# Patient Record
Sex: FEMALE | Race: WHITE | NOT HISPANIC OR LATINO | Employment: OTHER | URBAN - METROPOLITAN AREA
[De-identification: names, ages, dates, MRNs, and addresses within clinical notes are randomized per-mention and may not be internally consistent; named-entity substitution may affect disease eponyms.]

---

## 2023-11-30 ENCOUNTER — TELEPHONE (OUTPATIENT)
Dept: HEMATOLOGY/ONCOLOGY | Facility: HOSPITAL | Age: 73
End: 2023-11-30

## 2023-12-04 PROBLEM — R59.0 AXILLARY LYMPHADENOPATHY: Status: ACTIVE | Noted: 2023-12-04

## 2023-12-04 PROBLEM — C50.912 CARCINOMA OF LEFT BREAST METASTATIC TO AXILLARY LYMPH NODE (MULTI): Status: ACTIVE | Noted: 2023-12-04

## 2023-12-04 PROBLEM — C77.3 CARCINOMA OF LEFT BREAST METASTATIC TO AXILLARY LYMPH NODE (MULTI): Status: ACTIVE | Noted: 2023-12-04

## 2023-12-04 PROBLEM — N63.20 LEFT BREAST MASS: Status: ACTIVE | Noted: 2023-12-04

## 2023-12-04 RX ORDER — VALSARTAN AND HYDROCHLOROTHIAZIDE 320; 25 MG/1; MG/1
1 TABLET, FILM COATED ORAL DAILY
COMMUNITY

## 2023-12-04 RX ORDER — AMLODIPINE BESYLATE 5 MG/1
5 TABLET ORAL DAILY
COMMUNITY
Start: 2022-03-08

## 2023-12-04 RX ORDER — AZITHROMYCIN 250 MG/1
500 TABLET, FILM COATED ORAL DAILY
COMMUNITY
Start: 2023-03-23 | End: 2024-03-21 | Stop reason: ALTCHOICE

## 2023-12-04 RX ORDER — ALENDRONATE SODIUM 70 MG/1
70 TABLET ORAL
COMMUNITY
Start: 2023-09-13

## 2023-12-04 RX ORDER — MULTIVITAMIN WITH IRON
1 TABLET ORAL DAILY
COMMUNITY
Start: 2022-03-08

## 2023-12-04 RX ORDER — CHLORHEXIDINE GLUCONATE ORAL RINSE 1.2 MG/ML
15 SOLUTION DENTAL 2 TIMES DAILY PRN
COMMUNITY
Start: 2023-04-25 | End: 2024-03-21 | Stop reason: ALTCHOICE

## 2023-12-08 ENCOUNTER — OFFICE VISIT (OUTPATIENT)
Dept: HEMATOLOGY/ONCOLOGY | Facility: CLINIC | Age: 73
End: 2023-12-08
Payer: MEDICARE

## 2023-12-08 VITALS
TEMPERATURE: 98.1 F | DIASTOLIC BLOOD PRESSURE: 79 MMHG | BODY MASS INDEX: 28.95 KG/M2 | WEIGHT: 169.97 LBS | SYSTOLIC BLOOD PRESSURE: 147 MMHG | HEART RATE: 79 BPM

## 2023-12-08 DIAGNOSIS — C77.3 CARCINOMA OF LEFT BREAST METASTATIC TO AXILLARY LYMPH NODE (MULTI): ICD-10-CM

## 2023-12-08 DIAGNOSIS — C50.912 CARCINOMA OF LEFT BREAST METASTATIC TO AXILLARY LYMPH NODE (MULTI): ICD-10-CM

## 2023-12-08 PROCEDURE — 99214 OFFICE O/P EST MOD 30 MIN: CPT | Performed by: INTERNAL MEDICINE

## 2023-12-08 PROCEDURE — 1159F MED LIST DOCD IN RCRD: CPT | Performed by: INTERNAL MEDICINE

## 2023-12-08 PROCEDURE — 1126F AMNT PAIN NOTED NONE PRSNT: CPT | Performed by: INTERNAL MEDICINE

## 2023-12-08 ASSESSMENT — PAIN SCALES - GENERAL: PAINLEVEL: 0-NO PAIN

## 2023-12-08 NOTE — PROGRESS NOTES
Patient Visit Information:   Visit Type: Follow Up Visit      Cancer History:          Breast         AJCC Edition: 8th (AJCC), Diagnosis Date: 07-Feb-2020, IIIB, cT4d cN1 cM0 G3     Treatment Synopsis:    73-year-old postmenopausal  female with left-sided her2 positive inflammatory breast cancer, stage IIIB (T4d N1 M0). The patient's breast cancer was diagnosed  on February 7, 2020, and is estrogen receptor negative at 0%, progesterone receptor negative at 0%, and HER-2/musa positive.     CURRENT THERAPY: Close surveillance      Details of her history are as follows:      02/07/2020: Left breast US. Showed large mass in the subareolar complex measuring 12:00, measuring 3.7 x 2.7 x 2.6cm. This mass extended to  the 1:00 position, 1 cm from the nipple. Also showed another mass at 11:00, 4cm from the nipple, measuring 1.0 x 0.4 x 0.4cm. Scanning of the left axilla showed several abnormal LNs with calcifications, the largest of which measure 4.0 x 4.0 x 2.6cm.       02/07/2020: Left breast skin punch biopsy. Pathology reveled invasive ductal carcinoma , grade 2, involving the dermis with LVI      02/12/2020: US-guided left breast biopsy at 11:00, 4cm from the nipple. Pathology revealed invasive ductal carcinoma, grade 2-3 with receptor  status as above      02/12/2020: US-guided left breast biopsy at 12:00, 1cm from the nipple. Pathology revealed invasive ductal carcinoma, grade 2-3 with receptor  status as above. DCIS was present      02/12/2020: US-guided left axillary LN biopsy. Pathology revealed metastatic carcinoma      02/25/2020: Bone scan revealed no evidence of distant metastatic disease      02/25/2020: CT of CAP. Showed left breast mass, numerous enlarged left axillary LNs, no evidence of distant metastatic disease      02/25/2020: Echocardiogram showed LVEF of 55-60%      02/28/2020: Mediport placement      03/05/2020: Received cycle #1 of TCHP      07/10/2020: Completed 6 cycles of TCHP       07/31/2020: Started maintenance HP      07/31/2020: Received first cycle of maintenance HP      09/09/2020: Completed a left breast mastectomy with axillary LN dissection; Pathology revealed carcinoma involving vascular spaces at multiple  sites at tumor bed sites with largest invasion being 4mm , but no residual disease and extensive DCIS and LVI, and 0/10 LN with 2 tumors with therapy effect and 1 with isolated tumor cells.    12/11/2020: Received the last dose of  maintenance HP. Altogether patient received 6 cycle of TCHP and 6  and 6 cycles of maintenance HP.      01/22/2021: Received the first cycle of Kadcyla (TDM-1).    07/09/2021: Last cycle of Kadcycla. Discontinued due to persistent thrombocytopenia. All together patient received  12 cycles of Pertuzumab/Herceptin and 9 cycles of Kadcyla.       9/22/2021: Underwent a bone marrow biospy due to persistent thrombocytopenia. No abnormality was detected     19. 12/03/2021: MRI of Lumbar spine, SACRUM-COCCYX. This showed intraosseous disc herniation at L4 and mild DJD with no evidence of malignancy.            History of Present Illness:      ID Statement:    RHONDA HSU is a 73 year old Female        Chief Complaint: 73 year old post-menopausal   female with  stage IIIB left-sided inflammatory breast cancer. Here for a follow-up visit.   Interval History:    Ms. Hsu is a 73-year-old postmenopausal  female with left-sided her2 positive inflammatory breast cancer, stage IIIB (T4d N1 M0). The patient's breast  cancer was diagnosed on February 7, 2020, and is estrogen receptor negative at 0%, progesterone receptor negative at 0%, and HER-2/musa positive.      She is s/p a left breast mastectomy with axillary LN dissection on 09/09/2020. Pathology revealed carcinoma involving vascular spaces at multiple sites at tumor bed sites with largest invasion being 4mm , but no residual disease and extensive DCIS and  LVI, and 0/10 LN with 2 tumors  with therapy effect and 1 with isolated tumor cells.      S/P post-mastectomy radiation with proton therapy.  Received the last dose of  maintenance HP on 12/11/2020. Altogether patient received 6 cycle of TCHP and 6 cycles of maintenance HP.      INTERVAL HISTORY:     She has no new complaints today.  Reports that she is  doing well with no complaints.     Her last echo was completed on 07/07/21 and showed a LVEF of 65-70%. PET/CT (2/12/21) and mammogram (3/15/23) were unremarkable.                     Review of Systems:   Review of Systems:    CONSTITUTIONAL:  Usual activity level, good appetite, no fever, chills, malaise, weight loss, fatigue.  HEAD/NECK: no  headache, vision changes, epistaxis+, congestion,  RESPIRATORY:  No cough, hemoptysis, wheezing, SOB, pain.  CARDIOVASCULAR:  No chest pain, orthopnea, palpitations, syncope, edema, calf pain.  BREAST:  No pain, mass+, discharge, skin changes+, swelling, nipple changes+.  GASTROINTESTINAL:  No indigestion, nausea, vomiting, diarrhea+, constipation, pain, distention, bleeding.  GENITOURINARY:  No dysuria, hematuria, frequency, flank pain,   MUSCULOSKELETAL:  No new bone/joint pain, swelling, decreased ROM, weakness.   INTEGUMENTARY:  No rash, pruritis, wounds, inflammation,  HEMATOLOGIC/LYMPHATIC:  No night sweats, adenopathy, bleeding, petechiae, infection events.  NEUROLOGIC:  No HA, memory changes, confusion, balance deficits, numbness, tingling, focal weakness.   PSYCHOSOCIAL:  Supportive family and friends, no anxiety or depression           Allergies and Intolerances:       Allergies:         Moxilin: Drug, Other, Active         niacin: Drug, Rash, Active         Seafood: Food, Other, Active         Shell Fish: Food, Rash, Active     Outpatient Medication Profile:       Current Outpatient Medications:     alendronate (Fosamax) 70 mg tablet, Take 1 tablet (70 mg) by mouth every 7 days., Disp: , Rfl:     amLODIPine (Norvasc) 5 mg tablet, Take 1 tablet (5  mg) by mouth once daily., Disp: , Rfl:     multivitamin (Multiple Vitamins) tablet, Take 1 tablet by mouth once daily., Disp: , Rfl:     valsartan-hydrochlorothiazide (Diovan-HCT) 320-25 mg tablet, Take 1 tablet by mouth once daily., Disp: , Rfl:     azithromycin (Zithromax) 250 mg tablet, Take 2 tablets (500 mg) by mouth once daily., Disp: , Rfl:     chlorhexidine (Peridex) 0.12 % solution, Use 15 mL in the mouth or throat 2 times a day as needed., Disp: , Rfl:             Medical History:         Leukopenia: ICD-10: D72.819, Status: Active         Thrombocytopenia, acquired: ICD-10: D69.6, Status: Active         Dehydration: ICD-10: E86.0, Status: Active         Diarrhea: ICD-10: R19.7, Status: Active         Inflammatory breast cancer: ICD-10: C50.919, Status: Active         Malignant neoplasm of left breast: ICD-10: C50.912, Status:  Active, Description: Malignant neoplasm of left breast         High blood pressure: ICD-10: I10, Status: Active         Malignant neoplasm of left breast: ICD-10: C50.912, Status:  Active       Surg History:         [place] teeth extracted: ICD-10: K08.409, Status: Active         H/O laparoscopy: ICD-10: Z98.890, Status: Active     Family History: FH: breast cancer (Maternal Aunt  Age Unknown)         Social History:   Social Substance History:  ·  Smoking Status never smoker (1)   ·  Additional History           Family History:  Maternal Aunt had breast cancer in her 80s     Breast Cancer Risk Factors:  , Had her menarche at age 14 years, 1st pregnancy at age 29 years, menopause at age 49 years, never used BCP or HRT  (1)           Performance:   ECOG Performance Status: 0 Fully Active         Vitals and Measurements:     Visit Vitals  /79 (BP Location: Right arm, Patient Position: Sitting, BP Cuff Size: Adult)   Pulse 79   Temp 36.7 °C (98.1 °F) (Skin)   Wt 77.1 kg (169 lb 15.6 oz)   BMI 28.95 kg/m²   BSA 1.87 m²      Physical Exam:      Constitutional: Well developed,  awake/alert/oriented  x3, no distress, alert and cooperative   Eyes: PERRL, EOMI, clear sclera   ENMT: mucous membranes moist, no apparent injury,  no lesions seen   Head/Neck: Neck supple, no apparent injury, thyroid  without mass or tenderness, No JVD, trachea midline, no bruits   Respiratory/Thorax: Patent airways, CTAB, normal  breath sounds with good chest expansion, thorax symmetric   Cardiovascular: Regular, rate and rhythm, no murmurs,  2+ equal pulses of the extremities, normal S 1and S 2   Gastrointestinal: Nondistended, soft, non-tender,  no rebound tenderness or guarding, no masses palpable, no organomegaly, +BS, no bruits   Musculoskeletal: ROM intact, no joint swelling, normal  strength   Extremities: normal extremities, no cyanosis edema,  contusions or wounds, no clubbing   Neurological: alert and oriented x3, intact senses,  motor, response and reflexes, normal strength   Breast: Well healed L breast mastectomy. Some patchy  erythema around the area with one small, well-defined, circular lesion of unclear etiology but appears like a possible skin tag. No palpable axillary or supraclavicular lymphadenopathies bilaterally. No swelling of either upper extremity which had free  range of motion.   Lymphatic: No significant lymphadenopathy   Psychological: Appropriate mood and behavior   Skin: Warm and dry, no lesions, no rashes         Lab Results   Component Value Date    WBC 4.3 (L) 12/03/2021    HGB 12.1 12/03/2021    HCT 36.9 12/03/2021    MCV 98 12/03/2021     (L) 12/03/2021      Radiology Result:     ·  Results           Impression:     No mammographic evidence of malignancy in the right breast.     BI-RADS CATEGORY:     Category: 1 - Negative.  Recommendation: Annual mammogram     For any future breast imaging appointments, please call [phone] ([phone]).     Patient letter sent SN[facility]         Mamm Digital Diagnostic Mammography Unilateral Right with tomosynthesis [Mar 15 2023  2:18PM]           Assessment and Plan:   Assessment:    73-year-old postmenopausal  female with left-sided her2 positive inflammatory breast cancer, stage IIIB (T4d N1 M0). The patient's breast cancer was diagnosed  on February 7, 2020, and is estrogen receptor negative at 0%, progesterone receptor negative at 0%, and HER-2/musa positive.      She is s/p a left breast mastectomy with axillary LN dissection on 09/09/2020. Pathology revealed carcinoma involving vascular spaces at multiple sites at tumor bed sites with largest invasion being 4mm , but no residual disease and extensive DCIS and  LVI, and 0/10 LN with 2 tumors with therapy effect and 1 with isolated tumor cells.      Kadcycla, was permanently discontinued due to persistent thrombocytopenia. All together patient received  12 cycles of Pertuzumab/Herceptin and 9 cycles of Kadcyla.         Evaluation today does not show any evidence of disease recurrence. Recommend adjuvant Zometa. Patient is undergoing dental work and will consider Zometa after conclusion of dental procedures.      Plan:     Continue with close surveillance   Right mammogram in March 2024   Recommend adjuvant Zometa. Patient declined   RTC 6 months

## 2024-03-20 ENCOUNTER — APPOINTMENT (OUTPATIENT)
Dept: RADIOLOGY | Facility: CLINIC | Age: 74
End: 2024-03-20
Payer: MEDICARE

## 2024-03-20 ENCOUNTER — APPOINTMENT (OUTPATIENT)
Dept: HEMATOLOGY/ONCOLOGY | Facility: CLINIC | Age: 74
End: 2024-03-20
Payer: MEDICARE

## 2024-03-20 ENCOUNTER — APPOINTMENT (OUTPATIENT)
Dept: RADIOLOGY | Facility: CLINIC | Age: 74
End: 2024-03-20

## 2024-03-20 ENCOUNTER — APPOINTMENT (OUTPATIENT)
Dept: HEMATOLOGY/ONCOLOGY | Facility: CLINIC | Age: 74
End: 2024-03-20

## 2024-03-20 DIAGNOSIS — Z85.3 HISTORY OF RIGHT BREAST CANCER: Primary | ICD-10-CM

## 2024-03-21 ENCOUNTER — OFFICE VISIT (OUTPATIENT)
Dept: HEMATOLOGY/ONCOLOGY | Facility: CLINIC | Age: 74
End: 2024-03-21
Payer: MEDICARE

## 2024-03-21 ENCOUNTER — HOSPITAL ENCOUNTER (OUTPATIENT)
Dept: RADIOLOGY | Facility: CLINIC | Age: 74
Discharge: HOME | End: 2024-03-21
Payer: MEDICARE

## 2024-03-21 VITALS
RESPIRATION RATE: 18 BRPM | BODY MASS INDEX: 29.17 KG/M2 | WEIGHT: 171.3 LBS | HEART RATE: 76 BPM | OXYGEN SATURATION: 99 % | DIASTOLIC BLOOD PRESSURE: 81 MMHG | TEMPERATURE: 98 F | SYSTOLIC BLOOD PRESSURE: 132 MMHG

## 2024-03-21 DIAGNOSIS — Z85.3 HISTORY OF RIGHT BREAST CANCER: ICD-10-CM

## 2024-03-21 DIAGNOSIS — Z85.3 HISTORY OF LEFT BREAST CANCER: Primary | ICD-10-CM

## 2024-03-21 DIAGNOSIS — R01.1 HEART MURMUR ON PHYSICAL EXAMINATION: ICD-10-CM

## 2024-03-21 PROCEDURE — 1126F AMNT PAIN NOTED NONE PRSNT: CPT | Performed by: NURSE PRACTITIONER

## 2024-03-21 PROCEDURE — G0279 TOMOSYNTHESIS, MAMMO: HCPCS | Mod: RIGHT SIDE | Performed by: RADIOLOGY

## 2024-03-21 PROCEDURE — 99215 OFFICE O/P EST HI 40 MIN: CPT | Performed by: NURSE PRACTITIONER

## 2024-03-21 PROCEDURE — 77061 BREAST TOMOSYNTHESIS UNI: CPT | Mod: RT

## 2024-03-21 PROCEDURE — 1159F MED LIST DOCD IN RCRD: CPT | Performed by: NURSE PRACTITIONER

## 2024-03-21 PROCEDURE — 77065 DX MAMMO INCL CAD UNI: CPT | Mod: RIGHT SIDE | Performed by: RADIOLOGY

## 2024-03-21 PROCEDURE — 1036F TOBACCO NON-USER: CPT | Performed by: NURSE PRACTITIONER

## 2024-03-21 ASSESSMENT — PAIN SCALES - GENERAL: PAINLEVEL: 0-NO PAIN

## 2024-03-21 NOTE — PROGRESS NOTES
Oncology Follow-Up    Jess Reid  [id number]              Breast         AJCC Edition: 8th (AJCC), Diagnosis Date: 07-Feb-2020, IIIB, cT4d cN1 cM0 G3  Oncology History    No history exists.     Treatment Synopsis:    73-year-old postmenopausal  female with left-sided her2 positive inflammatory breast cancer, stage IIIB (T4d N1 M0). The patient's breast cancer was diagnosed  on February 7, 2020, and is estrogen receptor negative at 0%, progesterone receptor negative at 0%, and HER-2/musa positive.     CURRENT THERAPY: Close surveillance      Details of her history are as follows:      02/07/2020: Left breast US. Showed large mass in the subareolar complex measuring 12:00, measuring 3.7 x 2.7 x 2.6cm. This mass extended to  the 1:00 position, 1 cm from the nipple. Also showed another mass at 11:00, 4cm from the nipple, measuring 1.0 x 0.4 x 0.4cm. Scanning of the left axilla showed several abnormal LNs with calcifications, the largest of which measure 4.0 x 4.0 x 2.6cm.       02/07/2020: Left breast skin punch biopsy. Pathology reveled invasive ductal carcinoma , grade 2, involving the dermis with LVI      02/12/2020: US-guided left breast biopsy at 11:00, 4cm from the nipple. Pathology revealed invasive ductal carcinoma, grade 2-3 with receptor  status as above      02/12/2020: US-guided left breast biopsy at 12:00, 1cm from the nipple. Pathology revealed invasive ductal carcinoma, grade 2-3 with receptor  status as above. DCIS was present      02/12/2020: US-guided left axillary LN biopsy. Pathology revealed metastatic carcinoma      02/25/2020: Bone scan revealed no evidence of distant metastatic disease      02/25/2020: CT of CAP. Showed left breast mass, numerous enlarged left axillary LNs, no evidence of distant metastatic disease      02/25/2020: Echocardiogram showed LVEF of 55-60%      02/28/2020: Mediport placement      03/05/2020: Received cycle #1 of TCHP      07/10/2020: Completed 6 cycles of  TCHP      07/31/2020: Started maintenance HP      07/31/2020: Received first cycle of maintenance HP      09/09/2020: Completed a left breast mastectomy with axillary LN dissection; Pathology revealed carcinoma involving vascular spaces at multiple  sites at tumor bed sites with largest invasion being 4mm , but no residual disease and extensive DCIS and LVI, and 0/10 LN with 2 tumors with therapy effect and 1 with isolated tumor cells.    12/11/2020: Received the last dose of  maintenance HP. Altogether patient received 6 cycle of TCHP and 6  and 6 cycles of maintenance HP.      01/22/2021: Received the first cycle of Kadcyla (TDM-1).    07/09/2021: Last cycle of Kadcycla. Discontinued due to persistent thrombocytopenia. All together patient received  12 cycles of Pertuzumab/Herceptin and 9 cycles of Kadcyla.       9/22/2021: Underwent a bone marrow biospy due to persistent thrombocytopenia. No abnormality was detected     19. 12/03/2021: MRI of Lumbar spine, SACRUM-COCCYX. This showed intraosseous disc herniation at L4 and mild DJD with no evidence of malignancy.       Mirza Mercado presents for her oncology follow up visit. Jess lives in [place]. She has a son and grandchildren in town. Jess helps with her grandchildren who live close to her. She rates her energy level as 9/10 and reports no distress. Jess does monthly Breast self exams. She is recovering from a cold and has some congestion and a cough. Jess denies any unusual headaches, balance issues, depression, cough, shortness of breath, problems swallowing, changes in chest/breast area, abdominal pain, bone or muscle pain, vaginal bleeding, rectal bleeding, blood in the urine, vaginal dryness, swelling arms or legs, new or unusual skin moles or lesions.     Objective      Vitals:    03/21/24 1047   BP: 132/81   Pulse: 76   Resp: 18   Temp: 36.7 °C (98 °F)   SpO2: 99%        Constitutional: Well developed, alert/oriented x3, no distress,  cooperative   Eyes: clear sclera   ENMT: mucous membranes moist, no apparent lesions   Head/Neck: Neck supple, no bruits   Respiratory/Thorax: Patent airways, normal breath sounds with good chest expansion   Cardiovascular: Regular rate and rhythm, 2+ equal pulses of the extremities, 3/6 murmur   Gastrointestinal: Nondistended, soft, non-tender, no masses palpable, no organomegaly   Musculoskeletal: ROM intact, no joint swelling, normal strength   Extremities: normal extremities, no edema, cyanosis, contusions or wounds   Neurological: alert and oriented x3,  normal strength   Breast:    Lymphatic: No significant lymphadenopathy   Psychological: Appropriate mood and behavior   Skin: Warm and dry, no lesions, no rashes      Physical Exam  Chest:          Comments: Left mastectomy site with well healed incision, no masses, nodules, skin changes, discharge, + for telangectasia and thickened skin secondary to XRT. Right breast without masses, nodules, skin changes, discharge         Lab Results   Component Value Date    WBC 4.3 (L) 12/03/2021    HGB 12.1 12/03/2021    HCT 36.9 12/03/2021    MCV 98 12/03/2021     (L) 12/03/2021       Chemistry    Lab Results   Component Value Date/Time     12/03/2021 1102    K 3.7 12/03/2021 1102     12/03/2021 1102    CO2 27 12/03/2021 1102    BUN 16 12/03/2021 1102    CREATININE 0.55 12/03/2021 1102    Lab Results   Component Value Date/Time    CALCIUM 9.7 12/03/2021 1102    ALKPHOS 186 (H) 12/03/2021 1102    AST 41 (H) 12/03/2021 1102    ALT 34 12/03/2021 1102    BILITOT 0.5 12/03/2021 1102           Imaging:  Narrative & Impression   Interpreted By:  Loida Strong,  and Sendy Kc   STUDY:  BI MAMMO RIGHT DIAGNOSTIC TOMOSYNTHESIS;  3/21/2024 10:34 am      ACCESSION NUMBER(S):  [id number]      ORDERING CLINICIAN:  ARACELI NGO      INDICATION:  History of left mastectomy.      COMPARISON:  03/15/2023, 03/08/2022, 03/02/2021, 09/09/2020      FINDINGS:  2D  and tomosynthesis images were reviewed at 1 mm slice thickness.      Density:  The breast tissue is heterogeneously dense, which may  obscure small masses.      No suspicious masses or calcifications are identified.      IMPRESSION:  No mammographic evidence of malignancy.      BI-RADS CATEGORY:      BI-RADS Category:  1 Negative.  Recommendation:  Annual Screening.  Recommended Date:  1 Year.  Laterality:  Right.     Assessment/Plan    Jess is a 72 yo woman with a hx of ER/PA negative, Her2 + left IDC diagnosed in February 2020. She is s/p neoadjuvant TCH-P, mastectomy, Kadcyla x9 cycles, and XRT. She is currently on observation. There is no evidence of recurrent disease on today's exam.  Plan:  Exam is negative.  Discussed heart murmur. I would like her to see someone in our onco-cardiology program since this is a new finding per Jess. I will attempt to coordinate that appointment with Dr. St's follow up appointment with her in June. At this time, Jess has no symptoms.   Encouraged monthly breast self exams, plant based diet, keep alcohol <3 drinks/week, exercise at least 2.5 hours/week.  We reviewed signs/symptoms of recurrence including new masses, new pigmented lesion, tugging or pulling of the skin, nipple discharge, rash in or around the chest area, or any new finding that doesn't resolve within a 2-3 weeks.  All of Jess's questions/concerns were addressed.  Over 30 minutes of time was spent with this patient with >50% of the time with education, counseling, and coordination of care.  She will call with any concerns.  Diagnoses and all orders for this visit:  History of left breast cancer  Heart murmur on physical examination        Lilli Rasheed, APRN-CNP

## 2024-03-21 NOTE — PATIENT INSTRUCTIONS
1. Exercise 2.5 hours per week; bone strengthening, cardio-vascular, resistance training.  2. Please do self breast exams monthly.  3. Keep alcohol under 3 drinks per week.  4. Sun safety - limit sun exposure from 11a-2p when its at its hottest, apply 15-30 sun block and re-apply every 1-2 hours if perspiring or swimming.  5. Eat a plant based diet, add in oily fishes such as mackerel, tuna, and salmon.  6. Get in at least 1,000 mg of calcium per day through diet or supplement for bone strength. Examples of foods higher in calcium are milk, yogurt, fruited yogurt, oranges, fortified orange juice, almonds, almond milk, broccoli, spinach, bok melissa, mustard greens, puddings, custards, ice cream, fortified cereals, bars, and crackers.   7. I will refer you to onco-cardiology due to the heart murmur I detected on today's exam. They will do a specialized echocardiogram. I have asked the appointment to be at [facility] either before or after your appointment with Dr. St. Dr. St is not sure if she will be at [facility] at that time. If she isn't, we can reschedule to coordinate the appointment to be at [facility] on the same day as your appointment with Dr. St.  8. Please call the office if any new mass or rash in or around breast, or any uncontrolled symptoms that last over 2-3 weeks at [phone].  9. It was so nice seeing you today, Jess! Please call with any questions or concerns.  Have a nice spring and summer!  Thank you for choosing [facility] for your care.

## 2024-06-12 NOTE — PROGRESS NOTES
Patient Visit Information:   Patient name: Jess Reid  : 1950  Date of Service: 2024    Visit Type: Follow-up      Cancer History:          Breast         AJCC Edition: 8th (AJCC), Diagnosis Date:          Cancer Staging   No matching staging information was found for the patient.       Treatment Synopsis:       Jess Reid is a 73 y.o. postmenopausal  female with left-sided her2 positive inflammatory breast cancer, stage IIIB (T4d N1 M0). The patient's breast cancer was diagnosed  on 2020, and is estrogen receptor negative at 0%, progesterone receptor negative at 0%, and HER-2/musa positive.     CURRENT THERAPY: Close surveillance      ONCOLOGIC HISTORY:  Details of her breast cancer history are as follows:     2020: Left breast US. Showed large mass in the subareolar complex measuring 12:00, measuring 3.7 x 2.7 x 2.6cm. This mass extended to  the 1:00 position, 1 cm from the nipple. Also showed another mass at 11:00, 4cm from the nipple, measuring 1.0 x 0.4 x 0.4cm. Scanning of the left axilla showed several abnormal LNs with calcifications, the largest of which measure 4.0 x 4.0 x 2.6cm.   2020: Left breast skin punch biopsy. Pathology reveled invasive ductal carcinoma , grade 2, involving the dermis with LVI  2020: US-guided left breast biopsy at 11:00, 4cm from the nipple. Pathology revealed invasive ductal carcinoma, grade 2-3 with receptor  status as above  2020: US-guided left breast biopsy at 12:00, 1cm from the nipple. Pathology revealed invasive ductal carcinoma, grade 2-3 with receptor  status as above. DCIS was present  2020: US-guided left axillary LN biopsy. Pathology revealed metastatic carcinoma  2020: Bone scan revealed no evidence of distant metastatic disease  2020: CT of CAP. Showed left breast mass, numerous enlarged left axillary LNs, no evidence of distant metastatic disease  2020: Echocardiogram showed  LVEF of 55-60%  02/28/2020: Mediport placement  03/05/2020: Received cycle #1 of TCHP  07/10/2020: Completed 6 cycles of TCHP  07/31/2020: Started maintenance HP  07/31/2020: Received first cycle of maintenance HP  09/09/2020: Completed a left breast mastectomy with axillary LN dissection; Pathology revealed carcinoma involving vascular spaces at multiple  sites at tumor bed sites with largest invasion being 4mm , but no residual disease and extensive DCIS and LVI, and 0/10 LN with 2 tumors with therapy effect and 1 with isolated tumor cells.   12/11/2020: Received the last dose of  maintenance HP. Altogether patient received 6 cycle of TCHP and 6  and 6 cycles of maintenance HP.  01/22/2021: Received the first cycle of Kadcyla (TDM-1).   07/09/2021: Last cycle of Kadcycla. Discontinued due to persistent thrombocytopenia. All together patient received  12 cycles of Pertuzumab/Herceptin and 9 cycles of Kadcyla.  9/22/2021: Underwent a bone marrow biospy due to persistent thrombocytopenia. No abnormality was detected  12/03/2021: MRI of Lumbar spine, SACRUM-COCCYX. This showed intraosseous disc herniation at L4 and mild DJD with no evidence of malignancy.        History of Present Illness: Patient ID:  Jess Reid is a 73 y.o. y.o. female.     Chief Complaint and/or reason for visit: Here for a follow-up     Interval History:    Jess Reid is a pleasant  73 y.o. woman with history of ER/NV negative, Her2 + left IDC diagnosed in February 2020. She is s/p neoadjuvant TCH-P, mastectomy, Kadcyla x9 cycles, and XRT. She is currently on observation.     Here for a follow-up.     She reports that she had gum restoration this past Wednesday and still feel some discomfort. Otherwise, she had no new complaints. She has no cardiac symptoms including lightheadedness, CP, SOA upon exertion, palpitations.     She denies fever, chills, Wt loss, headaches, CP, SOB, N/V, abdominal pain, constipation, diarrhea, neuropathy, joint  pain, extremity swelling, rashes. Appetite is good and she is drinking fine.     Review of Systems:   A 14-point review of system was completed and was negative except for what is noted in HPI.      Allergies and Intolerances:       Allergies:   Allergies   Allergen Reactions    Niacin Unknown    Penicillins Unknown    Shellfish Containing Products Unknown             Outpatient Medication Profile:   Current Outpatient Medications on File Prior to Visit   Medication Sig Dispense Refill    alendronate (Fosamax) 70 mg tablet Take 1 tablet (70 mg) by mouth every 7 days.      amLODIPine (Norvasc) 5 mg tablet Take 1 tablet (5 mg) by mouth once daily.      multivitamin (Multiple Vitamins) tablet Take 1 tablet by mouth once daily.      valsartan-hydrochlorothiazide (Diovan-HCT) 320-25 mg tablet Take 1 tablet by mouth once daily.       No current facility-administered medications on file prior to visit.          Medical History:    Past Medical History:   Diagnosis Date    Personal history of other diseases of the circulatory system 02/07/2020    History of hypertension    Unspecified lump in the left breast, overlapping quadrants 02/13/2020    Mass overlapping multiple quadrants of left breast       Surgical History:   Past Surgical History:   Procedure Laterality Date    CT GUIDED PERCUTANEOUS BIOPSY BONE DEEP  9/22/2021    CT GUIDED PERCUTANEOUS BIOPSY BONE DEEP 9/22/2021 [facility]       Social Substance History:   Social History     Tobacco Use    Smoking status: Never    Smokeless tobacco: Never   Substance Use Topics    Alcohol use: Never     Social History     Substance and Sexual Activity   Drug Use Never       Family History:   No family history on file.     OBJECTIVE:     Visit Vitals  BP: ()/()   Arterial Line BP 1: ()/()     Pain Scale: 0      The ECOG performance scale today is Asymptomatic/0       Physical Exam:      Constitutional: Well developed, awake/alert/oriented  x3, no distress, alert and  "cooperative   Eyes: PERRL, EOMI, clear sclera   ENMT: mucous membranes moist, no apparent injury,  no lesions seen   Head/Neck: Neck supple, no apparent injury, thyroid without mass or tenderness, No JVD, trachea midline, no bruits   Respiratory/Thorax: Patent airways, CTAB, normal  breath sounds with good chest expansion, thorax symmetric   Cardiovascular: Regular, rate and rhythm, 1-2/6 systolic murmur,  2+ equal pulses of the extremities, normal S 1and S 2   Gastrointestinal: Nondistended, soft, non-tender,  no rebound tenderness or guarding, no masses palpable, no organomegaly, +BS, no bruits   Musculoskeletal: ROM intact, no joint swelling, normal  strength   Extremities: No LE edema   Neurological: alert and oriented x3, intact senses,  motor, response and reflexes, normal strength   Breast: s/p left sided mastectomy and axillary LN biopsy with well healed surgical incision. No palpable mass in the right breast. No palpable axillary or supraclavicular lymphadenopathies bilaterally. No swelling of either upper extremity which had free range of motion.   Lymphatic: No significant lymphadenopathy   Psychological: Appropriate mood and behavior   Skin: Warm and dry, no lesions, no rashes          LAB RESULTS    Lab Results   Component Value Date    WBC 4.3 (L) 12/03/2021    HGB 12.1 12/03/2021    HCT 36.9 12/03/2021    MCV 98 12/03/2021     (L) 12/03/2021     Lab Results   Component Value Date    NEUTROABS 3.10 12/03/2021       No results for input(s): \"CREATININE\", \"BUN\", \"NA\", \"K\", \"CL\", \"CO2\" in the last 72 hours.  No components found for: \"CALCGFR\"  Lab Results   Component Value Date    GLUCOSE 88 12/03/2021     Lab Results   Component Value Date     12/03/2021    K 3.7 12/03/2021     12/03/2021    CO2 27 12/03/2021    BUN 16 12/03/2021    CREATININE 0.55 12/03/2021    ALT 34 12/03/2021    AST 41 (H) 12/03/2021    ALKPHOS 186 (H) 12/03/2021    BILITOT 0.5 12/03/2021     No results found for: " "\"FOLATE\"  No results found for: \"SXCWYOAC53\"  Lab Results   Component Value Date    TSH 2.40 09/01/2021        Imaging:     No images are attached to the encounter.        Assessment and Plan:   Assessment     Jess Reid is a 73 y.o. post-menopausal women with PMHx of  ER/UT negative, Her2 + left IDC diagnosed in February 2020. She is s/p neoadjuvant TCH-P, mastectomy, Kadcyla x9 cycles, and XRT. She is currently on observation. There is no evidence of recurrent disease on today's exam.     Here for a follow-up. There is no evidence of recurrent disease on today's exam.      DEXA scan on 6/10/2022, showing osteopenia at left femur neck, total femur, and spine.  Zometa was recommended by Dr. St on 12/8/2023. At that time, Patient is undergoing dental work and will consider Zometa after conclusion of dental procedures.  She is up to date her dental appointments. Just had left sided gum restoration. Will get the other side done soon.   Today, we had a discussion regarding Zometa. After a careful consideration, patient declined Zometa treatment at this time (she had severe hypokalemia during chemo, and is afraid Zometa may cause it again).      Plan     No clinical evidence of recurrence today. Continue close observation for her personal history of stage III breast cancer  Appt scheduled with Onco-cardiology (Dr. Supa Godoy) on 6/19/2024 for murmurs noted during exam. No cardiac symptoms.  Take Vitamin D and Calcium supplements  She is to go back to survivor clinic with Lilli.  Next appointment in October, then in March with annual mammogram.    RTC prn    Bob Russell MD, PhD   Hematology/Oncology  [facility] [place]   "

## 2024-06-14 ENCOUNTER — OFFICE VISIT (OUTPATIENT)
Dept: HEMATOLOGY/ONCOLOGY | Facility: CLINIC | Age: 74
End: 2024-06-14
Payer: MEDICARE

## 2024-06-14 ENCOUNTER — APPOINTMENT (OUTPATIENT)
Dept: CARDIOLOGY | Facility: HOSPITAL | Age: 74
End: 2024-06-14
Payer: MEDICARE

## 2024-06-14 ENCOUNTER — APPOINTMENT (OUTPATIENT)
Dept: HEMATOLOGY/ONCOLOGY | Facility: CLINIC | Age: 74
End: 2024-06-14
Payer: MEDICARE

## 2024-06-14 VITALS
DIASTOLIC BLOOD PRESSURE: 76 MMHG | HEART RATE: 61 BPM | SYSTOLIC BLOOD PRESSURE: 130 MMHG | BODY MASS INDEX: 29.44 KG/M2 | WEIGHT: 172.84 LBS

## 2024-06-14 DIAGNOSIS — R01.1 MURMUR, HEART: Primary | ICD-10-CM

## 2024-06-14 DIAGNOSIS — Z85.3 HISTORY OF LEFT BREAST CANCER: Primary | ICD-10-CM

## 2024-06-14 DIAGNOSIS — Z92.3 HISTORY OF RADIATION THERAPY: ICD-10-CM

## 2024-06-14 PROCEDURE — 99214 OFFICE O/P EST MOD 30 MIN: CPT | Performed by: INTERNAL MEDICINE

## 2024-06-14 PROCEDURE — 1159F MED LIST DOCD IN RCRD: CPT | Performed by: INTERNAL MEDICINE

## 2024-06-14 PROCEDURE — 1126F AMNT PAIN NOTED NONE PRSNT: CPT | Performed by: INTERNAL MEDICINE

## 2024-06-14 ASSESSMENT — PATIENT HEALTH QUESTIONNAIRE - PHQ9
1. LITTLE INTEREST OR PLEASURE IN DOING THINGS: NOT AT ALL
SUM OF ALL RESPONSES TO PHQ9 QUESTIONS 1 & 2: 0
2. FEELING DOWN, DEPRESSED OR HOPELESS: NOT AT ALL

## 2024-06-14 ASSESSMENT — PAIN SCALES - GENERAL: PAINLEVEL: 0-NO PAIN

## 2024-06-14 NOTE — PATIENT INSTRUCTIONS
Please follow up with Lilli Rasheed NP in the survivorship clinic.     Please call us at [phone] option 5 then option 2 with any questions or concerns

## 2024-06-19 ENCOUNTER — APPOINTMENT (OUTPATIENT)
Dept: CARDIOLOGY | Facility: CLINIC | Age: 74
End: 2024-06-19
Payer: MEDICARE

## 2024-07-24 ENCOUNTER — OFFICE VISIT (OUTPATIENT)
Dept: CARDIOLOGY | Facility: CLINIC | Age: 74
End: 2024-07-24
Payer: MEDICARE

## 2024-07-24 ENCOUNTER — HOSPITAL ENCOUNTER (OUTPATIENT)
Dept: CARDIOLOGY | Facility: CLINIC | Age: 74
Discharge: HOME | End: 2024-07-24
Payer: MEDICARE

## 2024-07-24 ENCOUNTER — APPOINTMENT (OUTPATIENT)
Dept: CARDIOLOGY | Facility: HOSPITAL | Age: 74
End: 2024-07-24
Payer: MEDICARE

## 2024-07-24 VITALS
TEMPERATURE: 96.4 F | OXYGEN SATURATION: 99 % | DIASTOLIC BLOOD PRESSURE: 70 MMHG | WEIGHT: 171.52 LBS | SYSTOLIC BLOOD PRESSURE: 142 MMHG | HEART RATE: 59 BPM | BODY MASS INDEX: 29.21 KG/M2 | RESPIRATION RATE: 16 BRPM

## 2024-07-24 DIAGNOSIS — Z92.3 HISTORY OF RADIATION THERAPY: ICD-10-CM

## 2024-07-24 DIAGNOSIS — R01.1 MURMUR, HEART: ICD-10-CM

## 2024-07-24 DIAGNOSIS — Z85.3 HISTORY OF LEFT BREAST CANCER: ICD-10-CM

## 2024-07-24 DIAGNOSIS — E78.2 MIXED HYPERLIPIDEMIA: ICD-10-CM

## 2024-07-24 DIAGNOSIS — Z92.21 STATUS POST ADMINISTRATION OF CARDIOTOXIC CHEMOTHERAPY: Primary | ICD-10-CM

## 2024-07-24 DIAGNOSIS — R01.1 HEART MURMUR ON PHYSICAL EXAMINATION: ICD-10-CM

## 2024-07-24 LAB
AORTIC VALVE MEAN GRADIENT: 5.6 MMHG
AORTIC VALVE PEAK VELOCITY: 1.62 M/S
AV PEAK GRADIENT: 10.5 MMHG
AVA (PEAK VEL): 2.33 CM2
AVA (VTI): 2.37 CM2
EJECTION FRACTION APICAL 4 CHAMBER: 69.1
EJECTION FRACTION: 66 %
LEFT ATRIUM VOLUME AREA LENGTH INDEX BSA: 27.8 ML/M2
LEFT VENTRICLE INTERNAL DIMENSION DIASTOLE: 4.06 CM (ref 3.5–6)
LEFT VENTRICULAR OUTFLOW TRACT DIAMETER: 1.85 CM
MITRAL VALVE E/A RATIO: 1.03
RIGHT VENTRICLE FREE WALL PEAK S': 11.85 CM/S
RIGHT VENTRICLE PEAK SYSTOLIC PRESSURE: 36.9 MMHG
TRICUSPID ANNULAR PLANE SYSTOLIC EXCURSION: 2.4 CM

## 2024-07-24 PROCEDURE — 93010 ELECTROCARDIOGRAM REPORT: CPT | Performed by: STUDENT IN AN ORGANIZED HEALTH CARE EDUCATION/TRAINING PROGRAM

## 2024-07-24 PROCEDURE — 99204 OFFICE O/P NEW MOD 45 MIN: CPT | Performed by: STUDENT IN AN ORGANIZED HEALTH CARE EDUCATION/TRAINING PROGRAM

## 2024-07-24 PROCEDURE — 93306 TTE W/DOPPLER COMPLETE: CPT

## 2024-07-24 PROCEDURE — 99214 OFFICE O/P EST MOD 30 MIN: CPT | Performed by: STUDENT IN AN ORGANIZED HEALTH CARE EDUCATION/TRAINING PROGRAM

## 2024-07-24 PROCEDURE — 1036F TOBACCO NON-USER: CPT | Performed by: STUDENT IN AN ORGANIZED HEALTH CARE EDUCATION/TRAINING PROGRAM

## 2024-07-24 PROCEDURE — 93005 ELECTROCARDIOGRAM TRACING: CPT | Performed by: STUDENT IN AN ORGANIZED HEALTH CARE EDUCATION/TRAINING PROGRAM

## 2024-07-24 PROCEDURE — 1159F MED LIST DOCD IN RCRD: CPT | Performed by: STUDENT IN AN ORGANIZED HEALTH CARE EDUCATION/TRAINING PROGRAM

## 2024-07-24 PROCEDURE — 1126F AMNT PAIN NOTED NONE PRSNT: CPT | Performed by: STUDENT IN AN ORGANIZED HEALTH CARE EDUCATION/TRAINING PROGRAM

## 2024-07-24 PROCEDURE — 93306 TTE W/DOPPLER COMPLETE: CPT | Performed by: INTERNAL MEDICINE

## 2024-07-24 RX ORDER — ROSUVASTATIN CALCIUM 20 MG/1
20 TABLET, COATED ORAL DAILY
Qty: 30 TABLET | Refills: 11 | Status: CANCELLED | OUTPATIENT
Start: 2024-07-24 | End: 2025-07-24

## 2024-07-24 ASSESSMENT — PAIN SCALES - GENERAL: PAINLEVEL: 0-NO PAIN

## 2024-07-24 ASSESSMENT — ENCOUNTER SYMPTOMS
LOSS OF SENSATION IN FEET: 0
DEPRESSION: 0
OCCASIONAL FEELINGS OF UNSTEADINESS: 0

## 2024-07-24 NOTE — PROGRESS NOTES
Subjective   Jess Reid is a 73 y.o. female     Cancer Diagnosis: Left Breast    Treatment:   TCHP 3/2020 x 6, maintenance 7/2020 x 6   Kadcyla 1/2021 - 7/2021   Cumulative Dose: NA  Radiation: Left chest wall 11/2020-12/2020    Risk Factors: Herceptin, HTN   Social Hx: Never smoker    Echo 7/24/2024: Preserved biventricular function  CT Chest 10/15/2021- Mild coronary artery calcifications   EKG: EKG performed in clinic today shows normal sinus rhythm with no resting ST-T segment changes  Lipid 5/1/2024 Total 247,       She is here for cardiovascular evaluation due to history of treatment with Herceptin/radiation and murmur.     Patient currently denies any chest discomfort or shortness of breath.  Denies any orthopnea/PND.  Does not appear to have lower extremity edema    Review of Systems  A comprehensive review of systems was negative.     Past Medical History:   Diagnosis Date    Personal history of other diseases of the circulatory system 02/07/2020    History of hypertension    Unspecified lump in the left breast, overlapping quadrants 02/13/2020    Mass overlapping multiple quadrants of left breast       Past Surgical History:   Procedure Laterality Date    CT GUIDED PERCUTANEOUS BIOPSY BONE DEEP  9/22/2021    CT GUIDED PERCUTANEOUS BIOPSY BONE DEEP 9/22/2021 [facility]       Allergies   Allergen Reactions    Niacin Unknown    Penicillins Unknown    Shellfish Containing Products Unknown     No family history on file.    Objective   Visit Vitals  /70   Pulse 59   Temp 35.8 °C (96.4 °F) (Core)   Resp 16   Wt 77.8 kg (171 lb 8.3 oz)   SpO2 99%   BMI 29.21 kg/m²   Smoking Status Never   BSA 1.88 m²       Physical Exam  Visit Vitals  /70   Pulse 59   Temp 35.8 °C (96.4 °F) (Core)   Resp 16   Wt 77.8 kg (171 lb 8.3 oz)   SpO2 99%   BMI 29.21 kg/m²   Smoking Status Never   BSA 1.88 m²       General: awake, alert and oriented. No acute distress.   Skin: Skin is warm, dry and intact without  rashes or lesions. Appropriate color for ethnicity. Nail beds pink with no cyanosis or clubbing  HEENT: normocephalic, atraumatic; conjunctivae are clear without exudates or hemorrhage. Sclera is non-icteric. Eyelids are normal in appearance without swelling or lesions. Hearing intact. Nares are patent bilaterally. Moist mucous membranes.   Cardiovascular: Regular. No murmurs, gallops, or rubs are auscultated. S1 and S2 are heard and are of normal intensity. No JVD, no carotid bruits  Respiratory: Thorax symmetric. CTAB, breath sounds vesicular. No crackles, wheezes or ronchi.   Gastrointestinal: soft, non-distended, BS + x 4  Genitourinary: exam deferred  Musculoskeletal: moves all extremities  Extremities: pulses palpable bilaterally; no swelling or erythema; no edema  Neurological: alert & oriented x 3; no focal deficits  Psychiatric: appropriate mood and affect    Current Outpatient Medications   Medication Instructions    alendronate (FOSAMAX) 70 mg, oral, Every 7 days    amLODIPine (NORVASC) 5 mg, oral, Daily    multivitamin (Multiple Vitamins) tablet 1 tablet, oral, Daily    valsartan-hydrochlorothiazide (Diovan-HCT) 320-25 mg tablet 1 tablet, oral, Daily       Lab Review   Lab Results   Component Value Date    HGB 12.1 12/03/2021    HGB 12.6 10/15/2021    HGB 12.7 09/01/2021     (L) 12/03/2021    WBC 4.3 (L) 12/03/2021     12/03/2021    K 3.7 12/03/2021    CREATININE 0.55 12/03/2021    CREATININE 0.63 10/15/2021    CREATININE 0.50 (L) 10/15/2021    BUN 16 12/03/2021    CALCIUM 9.7 12/03/2021    INR 1.0 12/03/2021        Assessment/Plan   Cardiac issues are as follows:    History of radiation to the heart/hyperlipidemia  -I offered to initiate the patient on a statin.  She has lots of concerns about side effects.  Wants to discuss this with further with her PCP.  I asked her to call us back if she has any further questions.    History of cardiotoxic chemotherapy:  -No obvious evidence of  cardiotoxicity on most recent echocardiogram    Murmur:  -No obvious valvular lesions identified    RTC 1 year     Supa Godoy MD  Advanced Heart Failure/Transplant Cardiology  Cardio-Oncology  [facility] [place]

## 2024-07-24 NOTE — PATIENT INSTRUCTIONS
Talk to your primary care MD about starting Crestor 20 mg daily   Follow up with Dr. Godoy in one year

## 2024-10-16 ENCOUNTER — OFFICE VISIT (OUTPATIENT)
Dept: HEMATOLOGY/ONCOLOGY | Facility: CLINIC | Age: 74
End: 2024-10-16
Payer: COMMERCIAL

## 2024-10-16 VITALS
DIASTOLIC BLOOD PRESSURE: 82 MMHG | TEMPERATURE: 97.9 F | HEART RATE: 66 BPM | SYSTOLIC BLOOD PRESSURE: 132 MMHG | WEIGHT: 170.19 LBS | BODY MASS INDEX: 28.99 KG/M2

## 2024-10-16 DIAGNOSIS — Z92.21 HISTORY OF CANCER CHEMOTHERAPY: ICD-10-CM

## 2024-10-16 DIAGNOSIS — C77.3 CARCINOMA OF LEFT BREAST METASTATIC TO AXILLARY LYMPH NODE (MULTI): ICD-10-CM

## 2024-10-16 DIAGNOSIS — C50.912 CARCINOMA OF LEFT BREAST METASTATIC TO AXILLARY LYMPH NODE (MULTI): ICD-10-CM

## 2024-10-16 DIAGNOSIS — Z90.12 HISTORY OF LEFT MASTECTOMY: Primary | ICD-10-CM

## 2024-10-16 PROCEDURE — 1126F AMNT PAIN NOTED NONE PRSNT: CPT | Performed by: NURSE PRACTITIONER

## 2024-10-16 PROCEDURE — 1159F MED LIST DOCD IN RCRD: CPT | Performed by: NURSE PRACTITIONER

## 2024-10-16 PROCEDURE — 1160F RVW MEDS BY RX/DR IN RCRD: CPT | Performed by: NURSE PRACTITIONER

## 2024-10-16 PROCEDURE — 99215 OFFICE O/P EST HI 40 MIN: CPT | Performed by: NURSE PRACTITIONER

## 2024-10-16 ASSESSMENT — PAIN SCALES - GENERAL: PAINLEVEL_OUTOF10: 0-NO PAIN

## 2024-10-16 NOTE — PATIENT INSTRUCTIONS
Please call us at [phone] option 5 then option 2 with any questions or concerns       1. Exercise 2.5 hours per week; bone strengthening, cardio-vascular, resistance training.  2. Please do self breast exams monthly.  3. Keep alcohol under 3 drinks per week.  4. Sun safety - limit sun exposure from 11a-2p when its at its hottest, apply 15-30 sun block and re-apply every 1-2 hours if perspiring or swimming.  5. Eat a plant based diet, add in oily fishes such as mackerel, tuna, and salmon.  6. Get in at least 1,000 mg of calcium per day through diet or supplement for bone strength. Examples of foods higher in calcium are milk, yogurt, fruited yogurt, oranges, fortified orange juice, almonds, almond milk, broccoli, spinach, bok melissa, mustard greens, puddings, custards, ice cream, fortified cereals, bars, and crackers.   7. Follow up visit in March with Lilli with Mammogram  8. Please call the office if any new mass or rash in or around breast, or any uncontrolled symptoms that last over 2-3 weeks at [phone].  9. It was nice seeing you today, Jess! Your exam is negative.  Thank you for choosing [facility] for your care.  Have a Happy, Healthy, Holiday Season!

## 2024-10-16 NOTE — PROGRESS NOTES
Oncology Follow-Up    Jess Reid  [id number]            Breast         AJCC Edition: 8th (AJCC), Diagnosis Date: 07-Feb-2020, IIIB, cT4d cN1 cM0 G3      Treatment Synopsis:    73-year-old postmenopausal  female with left-sided her2 positive inflammatory breast cancer, stage IIIB (T4d N1 M0). The patient's breast cancer was diagnosed  on February 7, 2020, and is estrogen receptor negative at 0%, progesterone receptor negative at 0%, and HER-2/musa positive.     CURRENT THERAPY: Close surveillance      Details of her history are as follows:      02/07/2020: Left breast US. Showed large mass in the subareolar complex measuring 12:00, measuring 3.7 x 2.7 x 2.6cm. This mass extended to  the 1:00 position, 1 cm from the nipple. Also showed another mass at 11:00, 4cm from the nipple, measuring 1.0 x 0.4 x 0.4cm. Scanning of the left axilla showed several abnormal LNs with calcifications, the largest of which measure 4.0 x 4.0 x 2.6cm.       02/07/2020: Left breast skin punch biopsy. Pathology reveled invasive ductal carcinoma , grade 2, involving the dermis with LVI      02/12/2020: US-guided left breast biopsy at 11:00, 4cm from the nipple. Pathology revealed invasive ductal carcinoma, grade 2-3 with receptor  status as above      02/12/2020: US-guided left breast biopsy at 12:00, 1cm from the nipple. Pathology revealed invasive ductal carcinoma, grade 2-3 with receptor  status as above. DCIS was present      02/12/2020: US-guided left axillary LN biopsy. Pathology revealed metastatic carcinoma      02/25/2020: Bone scan revealed no evidence of distant metastatic disease      02/25/2020: CT of CAP. Showed left breast mass, numerous enlarged left axillary LNs, no evidence of distant metastatic disease      02/25/2020: Echocardiogram showed LVEF of 55-60%      02/28/2020: Mediport placement      03/05/2020: Received cycle #1 of TCHP      07/10/2020: Completed 6 cycles of TCHP      07/31/2020: Started maintenance  HP      07/31/2020: Received first cycle of maintenance HP      09/09/2020: Completed a left breast mastectomy with axillary LN dissection; Pathology revealed carcinoma involving vascular spaces at multiple  sites at tumor bed sites with largest invasion being 4mm , but no residual disease and extensive DCIS and LVI, and 0/10 LN with 2 tumors with therapy effect and 1 with isolated tumor cells.    12/11/2020: Received the last dose of  maintenance HP. Altogether patient received 6 cycle of TCHP and 6  and 6 cycles of maintenance HP.      01/22/2021: Received the first cycle of Kadcyla (TDM-1).    07/09/2021: Last cycle of Kadcycla. Discontinued due to persistent thrombocytopenia. All together patient received  12 cycles of Pertuzumab/Herceptin and 9 cycles of Kadcyla.       9/22/2021: Underwent a bone marrow biospy due to persistent thrombocytopenia. No abnormality was detected     19. 12/03/2021: MRI of Lumbar spine, SACRUM-COCCYX. This showed intraosseou      Subjective    Jess presents for her Oncology Follow Up Visit. She is having dental work done. She rates her energy level as 8/10 and reports no distress. She has not seen dermatology in awhile. Jess is doing monthly Breast self exams. Jess denies any unusual headaches, balance issues, depression, cough, shortness of breath, problems swallowing, changes in chest/breast area, abdominal pain, bone or muscle pain, vaginal bleeding, rectal bleeding, blood in the urine, vaginal dryness, swelling arms or legs, new or unusual skin moles or lesions.          Objective      Vitals:    10/16/24 1022   BP: 132/82   Pulse: 66   Temp: 36.6 °C (97.9 °F)        Constitutional: Well developed, alert/oriented x3, no distress, cooperative   Eyes: clear sclera   ENMT: mucous membranes moist, no apparent lesions   Head/Neck: Neck supple, no bruits   Respiratory/Thorax: Patent airways, normal breath sounds with good chest expansion   Cardiovascular: Regular rate and rhythm,  no murmurs, 2+ equal pulses of the extremities,   Gastrointestinal: Nondistended, soft, non-tender, no masses palpable, no organomegaly   Musculoskeletal: ROM intact, no joint swelling, normal strength   Extremities: normal extremities, no edema, cyanosis, contusions or wounds   Neurological: alert and oriented x3,  normal strength   Breast:  Left mastectomy site with well healed incision, + for telangectasia and significant scarring; no masses, nodules, skin changes, discharge. Right breast without masses, nodules, skin changes, discharge    Lymphatic: No significant lymphadenopathy   Psychological: Appropriate mood and behavior   Skin: Warm and dry, no lesions, no rashes      Physical Exam     Lab Results   Component Value Date    WBC 4.3 (L) 12/03/2021    HGB 12.1 12/03/2021    HCT 36.9 12/03/2021    MCV 98 12/03/2021     (L) 12/03/2021       Chemistry    Lab Results   Component Value Date/Time     12/03/2021 1102    K 3.7 12/03/2021 1102     12/03/2021 1102    CO2 27 12/03/2021 1102    BUN 16 12/03/2021 1102    CREATININE 0.55 12/03/2021 1102    Lab Results   Component Value Date/Time    CALCIUM 9.7 12/03/2021 1102    ALKPHOS 186 (H) 12/03/2021 1102    AST 41 (H) 12/03/2021 1102    ALT 34 12/03/2021 1102    BILITOT 0.5 12/03/2021 1102         Imaging:  Signed Time Phone Pager   Loida Strong MD 3/21/2024 11:25 [phone]     Exam Information    Status Exam Begun Exam Ended   Final 3/21/2024 10:13 3/21/2024 10:34     Study Result    Narrative & Impression   Interpreted By:  Loida Strong,  and Sendy Kc   STUDY:  BI MAMMO RIGHT DIAGNOSTIC TOMOSYNTHESIS;  3/21/2024 10:34 am      ACCESSION NUMBER(S):  [id number]      ORDERING CLINICIAN:  ARACELI NGO      INDICATION:  History of left mastectomy.      COMPARISON:  03/15/2023, 03/08/2022, 03/02/2021, 09/09/2020      FINDINGS:  2D and tomosynthesis images were reviewed at 1 mm slice thickness.      Density:  The breast tissue  is heterogeneously dense, which may  obscure small masses.      No suspicious masses or calcifications are identified.      IMPRESSION:  No mammographic evidence of malignancy.      BI-RADS CATEGORY:      BI-RADS Category:  1 Negative.  Recommendation:  Annual Screening.  Recommended Date:  1 Year.  Laterality:  Right.     Assessment/Plan    Jess is a 73 yo woman with a hx of multi-focal triple negative IDC G2-3 inflammatory breast cancer diagnosed February 2020. She is s/p TCHP chemotherapy, mastectomy, Kadcyla, and is now on observation. There is no evidence of recurrent disease on today's exam.     Plan:  Exam is negative  Continue Breast self exams   See dermatology  See Dr. Knight at least yearly in primary care.  Encouraged monthly breast self exams, plant based diet, keep alcohol <3 drinks/week, exercise at least 2.5 hours/week. Suggested looking up chair, wall, table exercises that can be done at home.  We reviewed signs/symptoms of recurrence including new masses, new pigmented lesion, tugging or pulling of the skin, nipple discharge, rash in or around the chest area, or any new finding that doesn't resolve within a 2-3 weeks.  All of Jess's questions/concerns were addressed.  Over 25 minutes of time was spent with this patient with >50% of the time with education, counseling, and coordination of care.   I will see Jess March 19th with her mammogram. She will call with any concerns.    Diagnoses and all orders for this visit:  History of left mastectomy  Carcinoma of left breast metastatic to axillary lymph node (Multi)  -     Clinic Appointment Request Follow Up; ARACELI NGO; Future  History of cancer chemotherapy      TARIQ Concepcion-CNP

## 2024-10-17 PROBLEM — Z92.21 HISTORY OF CANCER CHEMOTHERAPY: Status: ACTIVE | Noted: 2024-10-17

## 2024-10-17 PROBLEM — Z90.12 HISTORY OF LEFT MASTECTOMY: Status: ACTIVE | Noted: 2024-10-17

## 2025-03-19 ENCOUNTER — HOSPITAL ENCOUNTER (OUTPATIENT)
Dept: RADIOLOGY | Facility: CLINIC | Age: 75
Discharge: HOME | End: 2025-03-19
Payer: MEDICARE

## 2025-03-19 ENCOUNTER — OFFICE VISIT (OUTPATIENT)
Dept: HEMATOLOGY/ONCOLOGY | Facility: CLINIC | Age: 75
End: 2025-03-19
Payer: MEDICARE

## 2025-03-19 VITALS
OXYGEN SATURATION: 98 % | DIASTOLIC BLOOD PRESSURE: 77 MMHG | SYSTOLIC BLOOD PRESSURE: 124 MMHG | TEMPERATURE: 97.3 F | RESPIRATION RATE: 18 BRPM | BODY MASS INDEX: 29.7 KG/M2 | WEIGHT: 174.4 LBS | HEART RATE: 60 BPM

## 2025-03-19 DIAGNOSIS — Z90.12 HISTORY OF LEFT MASTECTOMY: Primary | ICD-10-CM

## 2025-03-19 DIAGNOSIS — Z85.3 HISTORY OF LEFT BREAST CANCER: ICD-10-CM

## 2025-03-19 DIAGNOSIS — Z12.31 ENCOUNTER FOR SCREENING MAMMOGRAM FOR MALIGNANT NEOPLASM OF BREAST: ICD-10-CM

## 2025-03-19 DIAGNOSIS — Z92.21 HISTORY OF CANCER CHEMOTHERAPY: ICD-10-CM

## 2025-03-19 DIAGNOSIS — Z09 ONCOLOGY FOLLOW-UP ENCOUNTER: ICD-10-CM

## 2025-03-19 PROCEDURE — 99214 OFFICE O/P EST MOD 30 MIN: CPT | Performed by: NURSE PRACTITIONER

## 2025-03-19 PROCEDURE — 77067 SCR MAMMO BI INCL CAD: CPT | Mod: RIGHT SIDE | Performed by: RADIOLOGY

## 2025-03-19 PROCEDURE — 1159F MED LIST DOCD IN RCRD: CPT | Performed by: NURSE PRACTITIONER

## 2025-03-19 PROCEDURE — 1126F AMNT PAIN NOTED NONE PRSNT: CPT | Performed by: NURSE PRACTITIONER

## 2025-03-19 PROCEDURE — 77063 BREAST TOMOSYNTHESIS BI: CPT | Mod: RIGHT SIDE | Performed by: RADIOLOGY

## 2025-03-19 PROCEDURE — 77067 SCR MAMMO BI INCL CAD: CPT | Mod: 52,RT

## 2025-03-19 PROCEDURE — 1160F RVW MEDS BY RX/DR IN RCRD: CPT | Performed by: NURSE PRACTITIONER

## 2025-03-19 ASSESSMENT — PAIN SCALES - GENERAL: PAINLEVEL_OUTOF10: 0-NO PAIN

## 2025-03-19 NOTE — PROGRESS NOTES
Oncology Follow-Up    Jess Reid  [id number]              Breast         AJCC Edition: 8th (AJCC), Diagnosis Date: 07-Feb-2020, IIIB, cT4d cN1 cM0 G3      Treatment Synopsis:    73-year-old postmenopausal  female with left-sided her2 positive inflammatory breast cancer, stage IIIB (T4d N1 M0). The patient's breast cancer was diagnosed  on February 7, 2020, and is estrogen receptor negative at 0%, progesterone receptor negative at 0%, and HER-2/musa positive.     CURRENT THERAPY: Close surveillance      Details of her history are as follows:      02/07/2020: Left breast US. Showed large mass in the subareolar complex measuring 12:00, measuring 3.7 x 2.7 x 2.6cm. This mass extended to  the 1:00 position, 1 cm from the nipple. Also showed another mass at 11:00, 4cm from the nipple, measuring 1.0 x 0.4 x 0.4cm. Scanning of the left axilla showed several abnormal LNs with calcifications, the largest of which measure 4.0 x 4.0 x 2.6cm.       02/07/2020: Left breast skin punch biopsy. Pathology reveled invasive ductal carcinoma , grade 2, involving the dermis with LVI      02/12/2020: US-guided left breast biopsy at 11:00, 4cm from the nipple. Pathology revealed invasive ductal carcinoma, grade 2-3 with receptor  status as above      02/12/2020: US-guided left breast biopsy at 12:00, 1cm from the nipple. Pathology revealed invasive ductal carcinoma, grade 2-3 with receptor  status as above. DCIS was present      02/12/2020: US-guided left axillary LN biopsy. Pathology revealed metastatic carcinoma      02/25/2020: Bone scan revealed no evidence of distant metastatic disease      02/25/2020: CT of CAP. Showed left breast mass, numerous enlarged left axillary LNs, no evidence of distant metastatic disease      02/25/2020: Echocardiogram showed LVEF of 55-60%      02/28/2020: Mediport placement      03/05/2020: Received cycle #1 of TCHP      07/10/2020: Completed 6 cycles of TCHP      07/31/2020: Started maintenance  HP      07/31/2020: Received first cycle of maintenance HP      09/09/2020: Completed a left breast mastectomy with axillary LN dissection; Pathology revealed carcinoma involving vascular spaces at multiple  sites at tumor bed sites with largest invasion being 4mm , but no residual disease and extensive DCIS and LVI, and 0/10 LN with 2 tumors with therapy effect and 1 with isolated tumor cells.    12/11/2020: Received the last dose of  maintenance HP. Altogether patient received 6 cycle of TCHP and 6  and 6 cycles of maintenance HP.      01/22/2021: Received the first cycle of Kadcyla (TDM-1).    07/09/2021: Last cycle of Kadcycla. Discontinued due to persistent thrombocytopenia. All together patient received  12 cycles of Pertuzumab/Herceptin and 9 cycles of Kadcyla.       9/22/2021: Underwent a bone marrow biospy due to persistent thrombocytopenia. No abnormality was detected     19. 12/03/2021: MRI of Lumbar spine, SACRUM-COCCYX. This showed intraosseous      Subjective    Jess presents for her Oncology Follow Up Visit. She rates her energy level as 8/10 and has no distress. Jess is not exercising at this time. She sees Dr. Knight every 6 months in primary care. Jess denies any unusual headaches, balance issues, depression, cough, shortness of breath, problems swallowing, changes in chest/breast area, abdominal pain, bone or muscle pain, vaginal bleeding, rectal bleeding, blood in the urine, vaginal dryness, swelling arms or legs, new or unusual skin moles or lesions.       Objective      Vitals:    03/19/25 1101   BP: 124/77   Pulse: 60   Resp: 18   Temp: 36.3 °C (97.3 °F)   SpO2: 98%      Physical Exam    Constitutional: Well developed, alert/oriented x3, no distress, cooperative   Eyes: clear sclera   ENMT: mucous membranes moist, no apparent lesions   Head/Neck: Neck supple, no bruits   Respiratory/Thorax: Patent airways, normal breath sounds with good chest expansion   Cardiovascular: Regular rate  and rhythm, no murmurs, 2+ equal pulses of the extremities,   Gastrointestinal: Nondistended, soft, non-tender, no masses palpable, no organomegaly   Musculoskeletal: ROM intact, no joint swelling, normal strength   Extremities: normal extremities, no edema, cyanosis, contusions or wounds   Neurological: alert and oriented x3,  normal strength   Breast:  left mastectomy with well healed incision, + for telangectasia and scarring at incision site; no masses, nodules, skin changes, discharge. Right breast without masses, nodules, skin changes, discharge     Lymphatic: No significant lymphadenopathy   Psychological: Appropriate mood and behavior   Skin: Warm and dry, no lesions, no rashes       Lab Results   Component Value Date    WBC 4.3 (L) 12/03/2021    HGB 12.1 12/03/2021    HCT 36.9 12/03/2021    MCV 98 12/03/2021     (L) 12/03/2021       Chemistry    Lab Results   Component Value Date/Time     12/03/2021 1102    K 3.7 12/03/2021 1102     12/03/2021 1102    CO2 27 12/03/2021 1102    BUN 16 12/03/2021 1102    CREATININE 0.55 12/03/2021 1102    Lab Results   Component Value Date/Time    CALCIUM 9.7 12/03/2021 1102    ALKPHOS 186 (H) 12/03/2021 1102    AST 41 (H) 12/03/2021 1102    ALT 34 12/03/2021 1102    BILITOT 0.5 12/03/2021 1102         Imaging:  Mammogram results pending at time of this dictation.       Assessment/Plan    Jses is a 73 yo woman with a hx of T4N1 left IDC G-2 diagnosed February 2020. She is s/p left mastectomy (dermal involvement), + SLNB, neoadjuvant , TCHP chemotherapy, and 9 cycles of Kadcyla. She is on observation. There is no evidence of recurrent disease on today's exam.     Plan:  Exam is negative.  Mammogram pending.  Encouraged exercise, look in to chair, wall, table, counter exercises that can be done at home.  Continue monthly breast self exams, plant based diet, keep alcohol <3 drinks/week, exercise at least 2.5 hours/week.  We reviewed signs/symptoms of  recurrence including new masses, new pigmented lesion, tugging or pulling of the skin, nipple discharge, rash in or around the chest area, or any new finding that doesn't resolve within a 2-3 weeks.  All of Jess's questions/concerns were addressed.  Over 25 minutes of time was spent with this patient with >50% of the time with education, counseling, and coordination of care.   I will see Jess back in one year with her mammogram. She will call with any concerns.     Diagnoses and all orders for this visit:  History of left mastectomy  History of left breast cancer  -     Clinic Appointment Request Follow Up; [facility]  -     Clinic Appointment Request Follow Up; [facility]; Future  History of cancer chemotherapy  Oncology follow-up encounter        Araceli Ngo, TARIQ-CNP

## 2025-03-21 PROBLEM — Z09 ONCOLOGY FOLLOW-UP ENCOUNTER: Status: ACTIVE | Noted: 2025-03-21

## 2025-03-21 NOTE — PATIENT INSTRUCTIONS
1. Exercise 2.5 hours per week; bone strengthening, cardio-vascular, resistance training.  2. Please do self breast exams monthly.  3. Keep alcohol under 3 drinks per week.  4. Sun safety - limit sun exposure from 11a-2p when its at its hottest, apply 15-30 sun block and re-apply every 1-2 hours if perspiring or swimming.  5. Eat a plant based diet, add in oily fishes such as mackerel, tuna, and salmon.  6. Get in at least 1,000 mg of calcium per day through diet or supplement for bone strength. Examples of foods higher in calcium are milk, yogurt, fruited yogurt, oranges, fortified orange juice, almonds, almond milk, broccoli, spinach, bok melissa, mustard greens, puddings, custards, ice cream, fortified cereals, bars, and crackers.   7. Your exam is negative. The results of your mammogram are pending. We will call if any short term follow up is needed. You will receive results in the mail as well.   8. Please call the office if any new mass or rash in or around breast, or any uncontrolled symptoms that last over 2-3 weeks at [phone].  9. It was nice seeing you today, Jess. I will see you back in one year with your mammogram.  Thank you for choosing [facility] for your care.

## 2025-07-23 ENCOUNTER — APPOINTMENT (OUTPATIENT)
Dept: CARDIOLOGY | Facility: CLINIC | Age: 75
End: 2025-07-23
Payer: MEDICARE